# Patient Record
Sex: FEMALE | Race: WHITE | Employment: PART TIME | URBAN - METROPOLITAN AREA
[De-identification: names, ages, dates, MRNs, and addresses within clinical notes are randomized per-mention and may not be internally consistent; named-entity substitution may affect disease eponyms.]

---

## 2017-02-22 ENCOUNTER — TRANSFERRED RECORDS (OUTPATIENT)
Dept: HEALTH INFORMATION MANAGEMENT | Facility: CLINIC | Age: 82
End: 2017-02-22

## 2017-02-22 ENCOUNTER — TELEPHONE (OUTPATIENT)
Dept: OPHTHALMOLOGY | Facility: CLINIC | Age: 82
End: 2017-02-22

## 2017-02-22 NOTE — TELEPHONE ENCOUNTER
"Patient with longstanding bleb right eye, developed \"tearing\" on Sunday, seen Monday by Magali Rebolledo ([facility] out of [facility]) with blebitis.  Started on Q 15 min Vigamox.  Saw Dr Ellis today with bleb leak.  He will inject with intravitreal abx today, patient will see me on Monday.  Will probably need bleb revision.  Amy Gee  "

## 2017-02-23 ENCOUNTER — TRANSFERRED RECORDS (OUTPATIENT)
Dept: HEALTH INFORMATION MANAGEMENT | Facility: CLINIC | Age: 82
End: 2017-02-23

## 2017-02-27 ENCOUNTER — OFFICE VISIT (OUTPATIENT)
Dept: OPHTHALMOLOGY | Facility: CLINIC | Age: 82
End: 2017-02-27
Attending: OPHTHALMOLOGY
Payer: COMMERCIAL

## 2017-02-27 DIAGNOSIS — H59.40 POSTPROCEDURAL BLEBITIS OF EYE: Primary | ICD-10-CM

## 2017-02-27 PROCEDURE — 99213 OFFICE O/P EST LOW 20 MIN: CPT | Mod: ZF

## 2017-02-27 RX ORDER — ERYTHROMYCIN 5 MG/G
OINTMENT OPHTHALMIC
COMMUNITY
Start: 2017-02-23 | End: 2017-05-15

## 2017-02-27 RX ORDER — MOXIFLOXACIN 5 MG/ML
1 SOLUTION/ DROPS OPHTHALMIC 4 TIMES DAILY
COMMUNITY
End: 2017-05-17

## 2017-02-27 RX ORDER — OFLOXACIN 3 MG/ML
1 SOLUTION/ DROPS OPHTHALMIC 4 TIMES DAILY
Qty: 1 BOTTLE | Refills: 3 | Status: SHIPPED | OUTPATIENT
Start: 2017-02-27 | End: 2017-05-17

## 2017-02-27 ASSESSMENT — TONOMETRY
IOP_METHOD: TONOPEN
OD_IOP_MMHG: 01
OS_IOP_MMHG: 19

## 2017-02-27 ASSESSMENT — REFRACTION_WEARINGRX
OS_CYLINDER: +1.50
OS_AXIS: 170
OD_SPHERE: +0.50
OD_ADD: +2.75
OS_ADD: +2.75
OS_SPHERE: -1.50
OD_CYLINDER: +0.75
OD_AXIS: 040

## 2017-02-27 ASSESSMENT — VISUAL ACUITY
OS_PH_CC: 20/50
OD_CC: 20/25
METHOD: SNELLEN - LINEAR
OS_CC: 20/60
OD_CC+: -1
CORRECTION_TYPE: GLASSES

## 2017-02-27 ASSESSMENT — CUP TO DISC RATIO
OS_RATIO: 0.7
OD_RATIO: 0.4

## 2017-02-27 ASSESSMENT — SLIT LAMP EXAM - LIDS
COMMENTS: NORMAL
COMMENTS: NORMAL

## 2017-02-27 ASSESSMENT — CONF VISUAL FIELD
METHOD: COUNTING FINGERS
OS_NORMAL: 1
OD_NORMAL: 1

## 2017-02-27 NOTE — PROGRESS NOTES
"Patient with longstanding bleb right eye, developed \"tearing\" on Sunday Feb 19, seen Monday by Magali Rebolledo ([facility] out of town) with blebitis.  Started on Q 15 min Vigamox.  Saw Dr Ellis the following day with bleb leak and injected with intravitreal antibiotics Tuesday Feb 21.      Patient notes visual acuity right eye poor but stable    Medications   Prednisolone left eye once a day   Vigamox every hour while awake right eye     Follows primarily with Dr. Martins at [facility].  Primary open angle glaucoma (POAG) -adv    1. 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 Glaucoma both eyes.    Trabeculectomy with mitomycin-C right eye 2003,    Ahmed left eye 2007   Intraocular pressure low right eye in past, left eye was in low teens 2014.       3. Follows up with Dr Martins    4. Pseudophakia both eyes     See Dr Martins 1 week, return here immediately for any worsening symptoms in the interim    Attending Physician Attestation:  Complete documentation of historical and exam elements from today's encounter can be found in the full encounter summary report (not reduplicated in this progress note). I personally obtained the chief complaint(s) and history of present illness. I confirmed and edited asnecessary the review of systems, past medical/surgical history, family history, social history, and examination findings as documented by others; and I examined the patient myself. I personally reviewed the relevant tests, images, and reports as documented above. I formulated and edited as necessary the assessment and plan and discussed the findings and management plan with the patient and family.  - Amy Gee MD 2:40 PM 2/27/2017    "

## 2017-02-27 NOTE — NURSING NOTE
Chief Complaints and History of Present Illnesses   Patient presents with     Consult For     red, watery     HPI    Affected eye(s):  Right   Symptoms:        Frequency:  Constant       Do you have eye pain now?:  No      Comments:  Red, watery RE that started 02/21  Feels that she needs to keep her head back to keep it from watering   States that the va has decreased  Jovanna Carmona COT 2:15 PM February 27, 2017

## 2017-02-27 NOTE — LETTER
"February 27,2017      Wilder joyner MD  [facility]  [address] , [address]  [place] [address]  Fax: [phone]      RE: JOSEFA NEGRETE  DO: 1935      Dear Dr. Sam joyner:    I had the pleasure of seeing Josefa negrete on February 27, 2017 at the [facility].  My exam findings are as follows:      Visual Acuity (Snellen - Linear)      Right Left   Dist cc 20/25 -1 20/60   Dist ph cc  20/50       Correction:  Glasses         Tonometry (Ton open, 2:31 PM)      Right Left   Pressure 01 19            Main Ophthalmology Exam     Slit Lamp Exam      Right Left    Lids/Lashes Normal Normal    Conjunctiva/Sclera thin, avascular bleb slightly on to cornea with small central leak, no cell in bleb Tube covered and in good position    Cornea Clear [place]     Anterior Chamber Deep and quiet, no cell Deep and quiet    Iris Round and reactive Pseudoexfoliation material    Lens Posterior chamber intraocular lens Posterior chamber intraocular lens      Fundus Exam      Right Left    Disc  thinning temporally    C/D Ratio 0.4 0.7             History and Present Illness:  Patient with longstanding bleb right eye, developed \"tearing\" on Sunday Feb 19, seen Monday by Magali Rebolledo ([facility] out of town) with bulbitis.  Started on Q 15 min Viam.  Saw Dr. Ellis the following day with bleb leak and injected with intravitreal antibiotics Tuesday Feb 21.      Patient notes visual acuity right eye poor but stable.    Medications:   Prednisolone left eye once a day   Viam every hour while awake right eye     Follows primarily with Dr. Sam joyner at [facility].  Primary open angle glaucoma (PO AG)   - Advanced    1. Bulbitis/possible endophthalmitis right eye   Bleb leak (small)   Bleb now clear, no cell in anterior chamber    Low intraocular pressure    Cost of Viam difficult for patient so will change to Ocuflox four times a day right eye for the next               week   Very thin " bleb, will need bleb revision right eye     2. Glaucoma both eyes.    Trabeculectomy with mitomycin-C right eye 2003,    Ah med left eye 2007   Intraocular pressure low right eye in past, left eye was in low teens 2014.       3. Follows up with Dr. Sam joyner    4. Pseudophakia both eyes     See Dr. Sam joyner 1 week, return here immediately for any worsening symptoms in the interim.      Thank you for allowing me to participate in her care.       Sincerely,       Amy Gee MD  Glaucoma   Havefrederic Professor of Ophthalmology       Cc: Alberto Ellis MD

## 2017-02-27 NOTE — MR AVS SNAPSHOT
After Visit Summary   2017    Tamika Mcguire    MRN: [id number]           Patient Information     Date Of Birth          1935        Visit Information        Provider Department      2017 2:00 PM Amy Gee MD [facility]        Today's Diagnoses     Postprocedural blebitis of eye    -  1       Follow-ups after your visit        Who to contact     Please call your clinic at [phone] to:    Ask questions about your health    Make or cancel appointments    Discuss your medicines    Learn about your test results    Speak to your doctor   If you have compliments or concerns about an experience at your clinic, or if you wish to file a complaint, please contact [facility] Patient Relations at [phone] or email us at [email or web address]         Additional Information About Your Visit        MyChart Information     [employer] is an electronic gateway that provides easy, online access to your medical records. With [employer], you can request a clinic appointment, read your test results, renew a prescription or communicate with your care team.     To sign up for [employer] visit the website at [email or web address]   You will be asked to enter the access code listed below, as well as some personal information. Please follow the directions to create your username and password.     Your access code is: [email or web address]  Expires: 2017  8:30 AM     Your access code will  in 90 days. If you need help or a new code, please contact your [facility] or call [phone] for assistance.        Care EveryWhere ID     This is your Care EveryWhere ID. This could be used by other organizations to access your [place] medical records  [id number]         Blood Pressure from Last 3 Encounters:   12 136/80    Weight from Last 3 Encounters:   12 64.5 kg (142 lb 3.2 oz)              Today, you had the following      No orders found for display         Today's Medication Changes          These changes are accurate as of: 2/27/17  3:07 PM.  If you have any questions, ask your nurse or doctor.               Start taking these medicines.        Dose/Directions    ofloxacin 0.3 % ophthalmic solution   Commonly known as:  OCUFLOX   Used for:  Postprocedural blebitis of eye   Started by:  Amy Gee MD        Dose:  1 drop   Place 1 drop into the right eye 4 times daily   Quantity:  1 Bottle   Refills:  3            Where to get your medicines      These medications were sent to [facility] [address] [facility] [address] [address] [facility] [place]  [address] [facility] [address]     Phone:  [phone]     ofloxacin 0.3 % ophthalmic solution                Primary Care Provider Office Phone # Fax #    Jerod Milligan [phone]       [address] [address]        Thank you!     Thank you for choosing EYE CLINIC  for your care. Our goal is always to provide you with excellent care. Hearing back from our patients is one way we can continue to improve our services. Please take a few minutes to complete the written survey that you may receive in the mail after your visit with us. Thank you!             Your Updated Medication List - Protect others around you: Learn how to safely use, store and throw away your medicines at www.disposemymeds.org.          This list is accurate as of: 2/27/17  3:07 PM.  Always use your most recent med list.                   Brand Name Dispense Instructions for use    atenolol 50 MG tablet    TENORMIN     Take 25 mg by mouth daily       erythromycin ophthalmic ointment    ROMYCIN     Apply 1/4 inch strip to right eye daily at bedtime.       hydrochlorothiazide 25 MG tablet    HYDRODIURIL    90 tablet    Take 1 tablet by mouth daily.       moxifloxacin 0.5 % ophthalmic solution    VIGAMOX     Place 1 drop into the right eye every hour (while awake)        ALIX 128 2 % ophthalmic solution   Generic drug:  sodium chloride      Place 1 drop into both eyes 2 times daily       ofloxacin 0.3 % ophthalmic solution    OCUFLOX    1 Bottle    Place 1 drop into the right eye 4 times daily       prednisoLONE acetate 1 % ophthalmic susp    PRED FORTE     Place 1 drop Into the left eye 2 times daily

## 2017-03-20 ENCOUNTER — OFFICE VISIT (OUTPATIENT)
Dept: OPHTHALMOLOGY | Facility: CLINIC | Age: 82
End: 2017-03-20
Attending: OPHTHALMOLOGY
Payer: COMMERCIAL

## 2017-03-20 DIAGNOSIS — H59.89 LEAKING OF CONJUNCTIVAL DRAINAGE BLEB: Primary | ICD-10-CM

## 2017-03-20 DIAGNOSIS — T81.31XA LEAKING OF CONJUNCTIVAL DRAINAGE BLEB: Primary | ICD-10-CM

## 2017-03-20 PROCEDURE — 92015 DETERMINE REFRACTIVE STATE: CPT | Mod: ZF

## 2017-03-20 PROCEDURE — 99213 OFFICE O/P EST LOW 20 MIN: CPT | Mod: ZF

## 2017-03-20 ASSESSMENT — REFRACTION_MANIFEST
OS_ADD: +2.50
OS_CYLINDER: +2.00
OS_AXIS: 170
OS_SPHERE: -2.00

## 2017-03-20 ASSESSMENT — TONOMETRY
IOP_METHOD: APPLANATION
OD_IOP_MMHG: 09
OS_IOP_MMHG: 19

## 2017-03-20 ASSESSMENT — CUP TO DISC RATIO
OS_RATIO: 0.7
OD_RATIO: 0.4

## 2017-03-20 ASSESSMENT — SLIT LAMP EXAM - LIDS
COMMENTS: NORMAL
COMMENTS: NORMAL

## 2017-03-20 ASSESSMENT — CONF VISUAL FIELD
OS_NORMAL: 1
OD_NORMAL: 1

## 2017-03-20 ASSESSMENT — VISUAL ACUITY
METHOD: SNELLEN - LINEAR
OD_SC: 20/20
OS_PH_SC: 20/50-1
OS_SC: 20/80
OS_SC+: -1

## 2017-03-20 NOTE — PROGRESS NOTES
Patient with longstanding bleb right eye.  Blebitis right eye Feb 2017     Patient notes visual acuity right eye poor but stable    Medications   Prednisolone left eye once a day   Ocuflox four times a day right eye   Refresh both eyes     Follows primarily with Dr. Martins at [facility].  Primary open angle glaucoma (POAG) -adv    1. Blebitishistory right eye   Bleb leak, now in new location, small   Bleb clear, no cell in anterior chamber    Better intraocular pressure    Ocuflox four times a day right eye until today, now go to twice a day until surgery   Very thin bleb with new leak, will need bleb revision right eye     2. Glaucoma both eyes.    Trabeculectomy with mitomycin-C right eye 2003,    Ahmed left eye 2007   Intraocular pressure low right eye in past, left eye was in low teens 2014.      3. 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 I personally obtained the chief complaint(s) and history of present illness. I confirmed and edited asnecessary the review of systems, past medical/surgical history, family history, social history, and examination findings as documented by others; and I examined the patient myself. I personally reviewed the relevant tests, images, and reports as documented above. I formulated and edited as necessary the assessment and plan and discussed the findings and management plan with the patient and family.  - Amy Gee MD 2:37 PM 3/20/2017

## 2017-03-20 NOTE — NURSING NOTE
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    Mike BAIRES  1:30 PM03/20/2017

## 2017-03-20 NOTE — MR AVS SNAPSHOT
After Visit Summary   3/20/2017    Tamika Mcguire    MRN: [id number]           Patient Information     Date Of Birth          1935        Visit Information        Provider Department      3/20/2017 1:00 PM Amy Gee MD [facility]        Today's Diagnoses     Leaking of conjunctival drainage bleb - Right Eye    -  1       Follow-ups after your visit        Your next 10 appointments already scheduled     Apr 12, 2017 12:30 PM CDT   Post-Op with Amy Gee MD   Eye Clinic ([facility])    [address]  [address]  [address]  [facility] [address]   [phone]            Apr 19, 2017  2:00 PM CDT   Post-Op with Amy Gee MD   Eye Clinic ([facility])    [address]  [address]  [address]  [facility] [address]   [phone]            May 03, 2017  2:15 PM CDT   Post-Op with Amy Gee MD   Eye Clinic ([facility])    [address]  [address]  [address]  [facility] [address]   [phone]            May 17, 2017  1:15 PM CDT   Post-Op with Amy Gee MD   Eye Clinic ([facility])    [address]  [address]  [address] [address]  [facility] [address] [phone]              Who to contact     Please call your clinic at [phone] to:    Ask questions about your health    Make or cancel appointments    Discuss your medicines    Learn about your test results    Speak to your doctor   If you have compliments or concerns about an experience at your clinic, or if you wish to file a complaint, please contact [facility] Patient Relations at [phone] or email us at [email or web address]         Additional Information About Your Visit        Accessory Addict Societyhart Information     "[employer]" is an electronic gateway that provides easy, online access to your medical records. With "[employer]", you can request a clinic appointment,  read your test results, renew a prescription or communicate with your care team.     To sign up for [employer] Win[employer] Communicationshart visit the website at [email or web address]   You will be asked to enter the access code listed below, as well as some personal information. Please follow the directions to create your username and password.     Your access code is: [email or web address]  Expires: 2017  9:30 AM     Your access code will  in 90 days. If you need help or a new code, please contact your [facility] or call [phone] for assistance.        Care EveryWhere ID     This is your Care EveryWhere ID. This could be used by other organizations to access your [place] medical records  [id number]         Blood Pressure from Last 3 Encounters:   12 136/80    Weight from Last 3 Encounters:   12 64.5 kg (142 lb 3.2 oz)              We Performed the Following     Monica-Operative Worksheet (Glaucoma)        Primary Care Provider Office Phone # Fax #    Jerod Milligan [phone]       [place] [address]        Thank you!     Thank you for choosing [facility]YE CLINIC  for your care. Our goal is always to provide you with excellent care. Hearing back from our patients is one way we can continue to improve our services. Please take a few minutes to complete the written survey that you may receive in the mail after your visit with us. Thank you!             Your Updated Medication List - Protect others around you: Learn how to safely use, store and throw away your medicines at www.disposemymeds.org.          This list is accurate as of: 3/20/17 11:59 PM.  Always use your most recent med list.                   Brand Name Dispense Instructions for use    atenolol 50 MG tablet    TENORMIN     Take 25 mg by mouth daily       erythromycin ophthalmic ointment    ROMYCIN     Apply 1/4 inch strip to right eye daily at bedtime.        hydrochlorothiazide 25 MG tablet    HYDRODIURIL    90 tablet    Take 1 tablet by mouth daily.       moxifloxacin 0.5 % ophthalmic solution    VIGAMOX     Place 1 drop into the right eye 4 times daily       ofloxacin 0.3 % ophthalmic solution    OCUFLOX    1 Bottle    Place 1 drop into the right eye 4 times daily       prednisoLONE acetate 1 % ophthalmic susp    PRED FORTE     Place 1 drop Into the left eye daily       REFRESH OP      Apply to eye 4 times daily Both eyes.

## 2017-03-20 NOTE — LETTER
2017      Wilder Martins MD  [facility]  [address] , [address]  [place] [address]  Fax: [phone]      RE: JOSEFA MCGUIRE  : 1935      Dear Dr. Martins:    I had the pleasure of seeing Josefa Mcguire on 2017 at the [facility].  My exam findings are as follows:      Visual Acuity (Snellen - Linear)      Right Left   Dist sc 20/20 20/80 -1   Dist ph sc  20/50-1            Tonometry (Applanation, 1:43 PM)      Right Left   Pressure 09 19            Main Ophthalmology Exam     Slit Lamp Exam      Right Left    Lids/Lashes Normal Normal    Conjunctiva/Sclera thin, avascular bleb slightly on to cornea with superior leak, no cell in bleb, small leak on posterior bleb Tube covered and in good position    Cornea Clear dsaek     Anterior Chamber Deep and quiet, no cell Deep and quiet    Iris Round and reactive Pseudoexfoliation material    Lens Posterior chamber intraocular lens Posterior chamber intraocular lens      Fundus Exam      Right Left    Disc  thinning temporally    C/D Ratio 0.4 0.7              History and Present Illness:  Primary open angle glaucoma (POAG) -adv  Follows primarily with Dr. Martins at [facility].    Patient with longstanding bleb right eye.  Blebitis right eye 2017.   Patient notes visual acuity right eye poor but stable.    Medications:   Prednisolone left eye once a day   Ocuflox four times a day right eye   Refresh both eyes       Assessment:   1. Blebitis history right eye   Bleb leak, now in new location, small   Bleb clear, no cell in anterior chamber    Better intraocular pressure    Ocuflox four times a day right eye until today, now go to twice a day until surgery   Very thin bleb with new leak, will need bleb revision right eye     2. Glaucoma both eyes.    Trabeculectomy with mitomycin-C right eye ,    Ahmed left eye    Intraocular pressure low right eye in past, left eye was in low teens  2014.      3. 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       Sincerely,     Amy Gee MD  Glaucoma   Havefrederic Professor of Ophthalmology

## 2017-04-11 ENCOUNTER — TRANSFERRED RECORDS (OUTPATIENT)
Dept: HEALTH INFORMATION MANAGEMENT | Facility: CLINIC | Age: 82
End: 2017-04-11

## 2017-04-12 ENCOUNTER — OFFICE VISIT (OUTPATIENT)
Dept: OPHTHALMOLOGY | Facility: CLINIC | Age: 82
End: 2017-04-12
Attending: OPHTHALMOLOGY
Payer: COMMERCIAL

## 2017-04-12 DIAGNOSIS — Z48.810 AFTERCARE FOLLOWING SURGERY OF A SENSORY ORGAN: Primary | ICD-10-CM

## 2017-04-12 PROCEDURE — 99212 OFFICE O/P EST SF 10 MIN: CPT

## 2017-04-12 ASSESSMENT — VISUAL ACUITY
OD_PH_CC: 20/70+2
OD_CC: 20/70
OS_CC+: -1
METHOD: SNELLEN - LINEAR
OS_CC: 20/40
CORRECTION_TYPE: GLASSES

## 2017-04-12 ASSESSMENT — TONOMETRY
IOP_METHOD: APPLANATION
OD_IOP_MMHG: 04
OS_IOP_MMHG: 16

## 2017-04-12 ASSESSMENT — SLIT LAMP EXAM - LIDS
COMMENTS: NORMAL
COMMENTS: NORMAL

## 2017-04-12 ASSESSMENT — CUP TO DISC RATIO
OD_RATIO: 0.4
OS_RATIO: 0.7

## 2017-04-12 NOTE — PROGRESS NOTES
Postoperative day # 1 bleb revision with amniotic membrane graft right eye 4/1//17  Patient with longstanding bleb right eye.  Blebitis right eye Feb 2017   Follows primarily with Dr. Martins at [facility]    Patient notes visual acuity right eye blurred    Medications   Prednisolone    Taper right eye   left eye once a day    Refresh both eyes     Impression    1. Blebitis history right eye   No leak today    Low intraocular pressure causing blur    2. Glaucoma both eyes. Primary open angle glaucoma (POAG) -adv   Trabeculectomy with mitomycin-C right eye 2003,    Ahmed left eye 2007   Intraocular pressure low right eye in past, left eye was in low teens 2014.      3. 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  Return to clinic 1 week    Attending Physician Attestation:  Complete documentation of historical and exam elements from today's encounter can be found in the full encounter summary report (not reduplicated in this progress note). I personally obtained the chief complaint(s) and history of present illness. I confirmed and edited asnecessary the review of systems, past medical/surgical history, family history, social history, and examination findings as documented by others; and I examined the patient myself. I personally reviewed the relevant tests, images, and reports as documented above. I formulated and edited as necessary the assessment and plan and discussed the findings and management plan with the patient and family.  - Amy Gee MD 1:04 PM 4/12/2017

## 2017-04-12 NOTE — NURSING NOTE
Chief Complaints and History of Present Illnesses   Patient presents with     Post Op (Ophthalmology) Right Eye     1 day post op RE     HPI    Affected eye(s):  Right   Symptoms:        Duration:  1 day      Do you have eye pain now?:  No      Comments:  1 day post op RE  Had good night, slept well  Prednisolone qd JIMMY BAIRES 12:12 PM April 12, 2017

## 2017-04-12 NOTE — MR AVS SNAPSHOT
After Visit Summary   4/12/2017    Tamika Mcguire    MRN: [id number]           Patient Information     Date Of Birth          1935        Visit Information        Provider Department      4/12/2017 12:30 PM Amy Gee MD Eye Clinic        Today's Diagnoses     Aftercare following surgery of a sensory organ    -  1       Follow-ups after your visit        Your next 10 appointments already scheduled     Apr 19, 2017  2:00 PM CDT   Post-Op with Amy Gee MD   Eye Clinic ([facility])    [address]  [address]  [address] [address]  [facility] [address]   [phone]            May 03, 2017  2:15 PM CDT   Post-Op with Amy Gee MD   Eye Clinic ([facility])    [address]  [address]  [address] [address]  [facility] [address]   [phone]            May 17, 2017  1:15 PM CDT   Post-Op with Amy Gee MD   Eye Clinic ([facility])    [address]  [address]  [address] [address]  [facility] [address]   [phone]              Who to contact     Please call your clinic at [phone] to:    Ask questions about your health    Make or cancel appointments    Discuss your medicines    Learn about your test results    Speak to your doctor   If you have compliments or concerns about an experience at your clinic, or if you wish to file a complaint, please contact [facility] Patient Relations at [phone] or email us at [email or web address]         Additional Information About Your Visit        MyChart Information     [employer] is an electronic gateway that provides easy, online access to your medical records. With [employer], you can request a clinic appointment, read your test results, renew a prescription or communicate with your care team.     To sign up for [employer] visit the website at [email or web address]   You will be asked to enter the access code listed  below, as well as some personal information. Please follow the directions to create your username and password.     Your access code is: [email or web address]  Expires: 2017  9:30 AM     Your access code will  in 90 days. If you need help or a new code, please contact your [facility] or call [phone] for assistance.        Care EveryWhere ID     This is your Care EveryWhere ID. This could be used by other organizations to access your [place] medical records  [id number]         Blood Pressure from Last 3 Encounters:   12 136/80    Weight from Last 3 Encounters:   12 64.5 kg (142 lb 3.2 oz)              Today, you had the following     No orders found for display       Primary Care Provider Office Phone # Fax #    Jerod Milligan [phone]       [place] [address]        Thank you!     Thank you for choosing EYE CLINIC  for your care. Our goal is always to provide you with excellent care. Hearing back from our patients is one way we can continue to improve our services. Please take a few minutes to complete the written survey that you may receive in the mail after your visit with us. Thank you!             Your Updated Medication List - Protect others around you: Learn how to safely use, store and throw away your medicines at www.disposemymeds.org.          This list is accurate as of: 17  1:19 PM.  Always use your most recent med list.                   Brand Name Dispense Instructions for use    atenolol 50 MG tablet    TENORMIN     Take 25 mg by mouth daily       erythromycin ophthalmic ointment    ROMYCIN     Apply 1/4 inch strip to right eye daily at bedtime.       hydrochlorothiazide 25 MG tablet    HYDRODIURIL    90 tablet    Take 1 tablet by mouth daily.       moxifloxacin 0.5 % ophthalmic solution    VIGAMOX     Place 1 drop into the right eye 4 times daily       ofloxacin 0.3 % ophthalmic  solution    OCUFLOX    1 Bottle    Place 1 drop into the right eye 4 times daily       prednisoLONE acetate 1 % ophthalmic susp    PRED FORTE     Place 1 drop Into the left eye daily       REFRESH OP      Apply to eye 4 times daily Both eyes.

## 2017-04-19 ENCOUNTER — OFFICE VISIT (OUTPATIENT)
Dept: OPHTHALMOLOGY | Facility: CLINIC | Age: 82
End: 2017-04-19
Attending: OPHTHALMOLOGY
Payer: COMMERCIAL

## 2017-04-19 DIAGNOSIS — Z48.810 AFTERCARE FOLLOWING SURGERY OF A SENSORY ORGAN: Primary | ICD-10-CM

## 2017-04-19 PROCEDURE — 99213 OFFICE O/P EST LOW 20 MIN: CPT | Mod: ZF

## 2017-04-19 ASSESSMENT — PACHYMETRY
OS_CT(UM): 575
OD_CT(UM): 556

## 2017-04-19 ASSESSMENT — CUP TO DISC RATIO
OD_RATIO: 0.4
OS_RATIO: 0.7

## 2017-04-19 ASSESSMENT — TONOMETRY
OS_IOP_MMHG: 21
IOP_METHOD: APPLANATION
OD_IOP_MMHG: 02

## 2017-04-19 ASSESSMENT — VISUAL ACUITY
OS_CC: 20/50
OD_PH_CC: 20/50-
OD_CC: 20/70
OS_CC+: -2
CORRECTION_TYPE: GLASSES
METHOD: SNELLEN - LINEAR
OD_CC+: -1
OS_PH_CC: 20/50+

## 2017-04-19 ASSESSMENT — SLIT LAMP EXAM - LIDS
COMMENTS: NORMAL
COMMENTS: NORMAL

## 2017-04-19 NOTE — PROGRESS NOTES
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  Blebitis right eye Feb 2017   Follows primarily with Dr. Martins at [facility]    Interval History: Notes bleb leaks when getting up in the morning. Has occasional discomfort. Use some artifical tears which helps at times.     Medications   Prednisolone    Taper right eye    Left eye once a day    Refresh both eyes     Impression    1. Blebitis history right eye   No leak today    Low intraocular pressure causing blur    2. Glaucoma both eyes. Primary open angle glaucoma (POAG) -adv   Trabeculectomy with mitomycin-C right eye 2003,    Ahmed left eye 2007   Intraocular pressure low right eye in past, left eye was in low teens 2014.      3. 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  Continue shield at bedtime  Return to clinic 2 weeks    Mika Sebastian MD PGY-3  Resident     Attending Physician Attestation:  Complete documentation of historical and exam elements from today's encounter can be found in the full encounter summary report (not reduplicated in this progress note). I personally obtained the chief complaint(s) and history of present illness. I confirmed and edited asnecessary the review of systems, past medical/surgical history, family history, social history, and examination findings as documented by others; and I examined the patient myself. I personally reviewed the relevant tests, images, and reports as documented above. I formulated and edited as necessary the assessment and plan and discussed the findings and management plan with the patient and family.  - Amy Gee MD 3:45 PM 4/19/2017

## 2017-04-19 NOTE — NURSING NOTE
Chief Complaints and History of Present Illnesses   Patient presents with     Follow Up For     Bleb revision with amniotic membrane graft right eye 4/1//17      HPI    Affected eye(s):  Right   Symptoms:     Blurred vision   Decreased vision   Floaters (Comment: present only for one day in the RE and then went away)         Do you have eye pain now?:  No      Comments:  Pt states RE is not doing well. Complains that it leaks intermittently. Notes this especially when she leans forward in the morning to get up.  Brittani Bajwa COA 2:12 PM April 19, 2017

## 2017-04-19 NOTE — MR AVS SNAPSHOT
After Visit Summary   4/19/2017    Tamika Mcguire    MRN: [id number]           Patient Information     Date Of Birth          1935        Visit Information        Provider Department      4/19/2017 2:00 PM Amy Gee MD Eye Clinic        Today's Diagnoses     Aftercare following surgery of a sensory organ    -  1       Follow-ups after your visit        Your next 10 appointments already scheduled     May 03, 2017  2:15 PM CDT   Post-Op with Amy Gee MD   Eye Clinic ([facility])    [address]  [address] [place] [address]   [phone]            May 17, 2017  1:15 PM CDT   Post-Op with Amy Gee MD   Eye Clinic ([facility])    [address]  [address] [address]   [phone]              Who to contact     Please call your clinic at [phone] to:    Ask questions about your health    Make or cancel appointments    Discuss your medicines    Learn about your test results    Speak to your doctor   If you have compliments or concerns about an experience at your clinic, or if you wish to file a complaint, please contact [facility] Patient Relations at [phone] or email us at [email or web address]         Additional Information About Your Visit        MyChart Information     [employer] is an electronic gateway that provides easy, online access to your medical records. With [employer], you can request a clinic appointment, read your test results, renew a prescription or communicate with your care team.     To sign up for [employer] visit the website at [email or web address]   You will be asked to enter the access code listed below, as well as some personal information. Please follow the directions to create your username and password.     Your access code is: [email or web address]  Expires: 5/24/2017  9:30 AM     Your access code will   in 90 days. If you need help or a new code, please contact your [facility] or call [phone] for assistance.        Care EveryWhere ID     This is your Care EveryWhere ID. This could be used by other organizations to access your [place] medical records  [id number]         Blood Pressure from Last 3 Encounters:   12 136/80    Weight from Last 3 Encounters:   12 64.5 kg (142 lb 3.2 oz)              Today, you had the following     No orders found for display       Primary Care Provider Office Phone # Fax #    Jerod Milligan [phone]       [address] [address]        Thank you!     Thank you for choosing EYE CLINIC  for your care. Our goal is always to provide you with excellent care. Hearing back from our patients is one way we can continue to improve our services. Please take a few minutes to complete the written survey that you may receive in the mail after your visit with us. Thank you!             Your Updated Medication List - Protect others around you: Learn how to safely use, store and throw away your medicines at www.disposemymeds.org.          This list is accurate as of: 17  3:52 PM.  Always use your most recent med list.                   Brand Name Dispense Instructions for use    atenolol 50 MG tablet    TENORMIN     Take 25 mg by mouth daily       erythromycin ophthalmic ointment    ROMYCIN     Apply 1/4 inch strip to right eye daily at bedtime.       hydrochlorothiazide 25 MG tablet    HYDRODIURIL    90 tablet    Take 1 tablet by mouth daily.       moxifloxacin 0.5 % ophthalmic solution    VIGAMOX     Place 1 drop into the right eye 4 times daily       ofloxacin 0.3 % ophthalmic solution    OCUFLOX    1 Bottle    Place 1 drop into the right eye 4 times daily       prednisoLONE acetate 1 % ophthalmic susp    PRED FORTE     Place 1 drop Into the left eye daily       REFRESH OP       Apply to eye 4 times daily Both eyes.

## 2017-05-03 ENCOUNTER — OFFICE VISIT (OUTPATIENT)
Dept: OPHTHALMOLOGY | Facility: CLINIC | Age: 82
End: 2017-05-03
Attending: OPHTHALMOLOGY
Payer: COMMERCIAL

## 2017-05-03 DIAGNOSIS — H59.40: Primary | ICD-10-CM

## 2017-05-03 DIAGNOSIS — Z48.810 AFTERCARE FOLLOWING SURGERY OF A SENSORY ORGAN: ICD-10-CM

## 2017-05-03 DIAGNOSIS — H40.1133 PRIMARY OPEN ANGLE GLAUCOMA OF BOTH EYES, SEVERE STAGE: ICD-10-CM

## 2017-05-03 PROCEDURE — 99213 OFFICE O/P EST LOW 20 MIN: CPT | Mod: ZF

## 2017-05-03 RX ORDER — FLUOROMETHOLONE 0.1 %
1 SUSPENSION, DROPS(FINAL DOSAGE FORM)(ML) OPHTHALMIC (EYE) 4 TIMES DAILY
Qty: 1 BOTTLE | Refills: 3 | Status: SHIPPED | OUTPATIENT
Start: 2017-05-03 | End: 2017-05-04

## 2017-05-03 RX ORDER — DORZOLAMIDE HYDROCHLORIDE AND TIMOLOL MALEATE 20; 5 MG/ML; MG/ML
1 SOLUTION/ DROPS OPHTHALMIC 2 TIMES DAILY
Qty: 1 BOTTLE | Refills: 11 | Status: SHIPPED | OUTPATIENT
Start: 2017-05-03 | End: 2017-07-06

## 2017-05-03 ASSESSMENT — TONOMETRY
OS_IOP_MMHG: 25
IOP_METHOD: APPLANATION
OD_IOP_MMHG: 38

## 2017-05-03 ASSESSMENT — VISUAL ACUITY
OD_CC: 20/40
OD_PH_CC: 20/30-1
OS_CC: 20/60
METHOD: SNELLEN - LINEAR
CORRECTION_TYPE: GLASSES
OD_CC+: +/-

## 2017-05-03 ASSESSMENT — PACHYMETRY
OD_CT(UM): 556
OS_CT(UM): 575

## 2017-05-03 ASSESSMENT — SLIT LAMP EXAM - LIDS: COMMENTS: NORMAL

## 2017-05-03 ASSESSMENT — CUP TO DISC RATIO
OS_RATIO: 0.7
OD_RATIO: 0.4

## 2017-05-03 NOTE — PROGRESS NOTES
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  Blebitis right eye Feb 2017   Follows primarily with Dr. Martins at [facility]    Interval History: Eye was sore all day yesterday. Today it is uncomfortable but not sore. Thinks it is swollen. Vision has improved but is not perfect.     Medications   Prednisolone    Taper right eye   Left eye once a day    Refresh both eyes     Impression    1. Blebitis history right eye   No leak today     2. Glaucoma both eyes. Primary open angle glaucoma (POAG) -adv   Trabeculectomy with mitomycin-C right eye 2003,    Ahmed left eye 2007   Bleb revision 4/1/2017    3. 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      Plan  Bleb revision with amniotic membrane graft right eye 4/1//17   Bleb now encapsulated, treat medically, may be steroid responder   Stop prednisolone both eyes    Begin FML twice a day right eye and once a day left eye (start 5/3/17)   Dorzolamide timolol right eye twice a day (start 5/3/17)       Continue shield at bedtime  Return to clinic 1 weeks    Mika Sebastian MD PGY-3  Resident     Attending Physician Attestation:  Complete documentation of historical and exam elements from today's encounter can be found in the full encounter summary report (not reduplicated in this progress note). I personally obtained the chief complaint(s) and history of present illness. I confirmed and edited asnecessary the review of systems, past medical/surgical history, family history, social history, and examination findings as documented by others; and I examined the patient myself. I personally reviewed the relevant tests, images, and reports as documented above. I formulated and edited as necessary the assessment and plan and discussed the findings and management plan with the patient and family.  - Amy Gee MD 3:45 PM  4/19/2017

## 2017-05-03 NOTE — PATIENT INSTRUCTIONS
Stop prednisolone both eyes (Pink cap)   Begin FML twice a day right eye and once a day left eye (Pink or white)   Dorzolamide timolol right eye twice a day (Navy cap)   Stop Vigamox/Ocuflox (Tan Top)   Continue artificial tears as needed

## 2017-05-03 NOTE — MR AVS SNAPSHOT
After Visit Summary   5/3/2017    Tamika Mcguire    MRN: [id number]           Patient Information     Date Of Birth          1935        Visit Information        Provider Department      5/3/2017 2:15 PM Amy Gee MD Eye Clinic        Today's Diagnoses     Inflammation of postprocedural bleb of eye    -  1    Primary open angle glaucoma of both eyes, severe stage        Aftercare following surgery of a sensory organ          Care Instructions     Stop prednisolone both eyes (Pink cap)   Begin FML twice a day right eye and once a day left eye (Pink or white)   Dorzolamide timolol right eye twice a day (Navy cap)   Stop Vigamox/Ocuflox (Tan Top)   Continue artificial tears as needed        Follow-ups after your visit        Your next 10 appointments already scheduled     May 12, 2017  9:00 AM CDT   Post-Op with Amy Gee MD   Eye Clinic ([facility])    [address]  [address]  [facility] [address]   [phone]            May 17, 2017  1:15 PM CDT   Post-Op with Amy Gee MD   Eye Clinic ([facility])    [address]  [address]  [address]  [facility] [address]   [phone]              Who to contact     Please call your clinic at [phone] to:    Ask questions about your health    Make or cancel appointments    Discuss your medicines    Learn about your test results    Speak to your doctor   If you have compliments or concerns about an experience at your clinic, or if you wish to file a complaint, please contact [facility] Patient Relations at [phone] or email us at [email or web address]         Additional Information About Your Visit        Prime Gridhart Information     [employer] is an electronic gateway that provides easy, online access to your medical records. With [employer], you can request a clinic appointment, read your test results, renew a  prescription or communicate with your care team.     To sign up for Bloomspothart visit the website at [email or web address]   You will be asked to enter the access code listed below, as well as some personal information. Please follow the directions to create your username and password.     Your access code is: [email or web address]  Expires: 2017  9:30 AM     Your access code will  in 90 days. If you need help or a new code, please contact your [facility] or call [phone] for assistance.        Care EveryWhere ID     This is your Care EveryWhere ID. This could be used by other organizations to access your [place] medical records  [id number]         Blood Pressure from Last 3 Encounters:   12 136/80    Weight from Last 3 Encounters:   12 64.5 kg (142 lb 3.2 oz)              Today, you had the following     No orders found for display         Today's Medication Changes          These changes are accurate as of: 5/3/17  3:52 PM.  If you have any questions, ask your nurse or doctor.               Start taking these medicines.        Dose/Directions    dorzolamide-timolol 2-0.5 % ophthalmic solution   Commonly known as:  COSOPT   Used for:  Primary open angle glaucoma of both eyes, severe stage   Started by:  Amy Gee MD        Dose:  1 drop   Place 1 drop into the right eye 2 times daily   Quantity:  1 Bottle   Refills:  11       fluorometholone 0.1 % ophthalmic susp   Commonly known as:  FML LIQUIFILM   Used for:  Inflammation of postprocedural bleb of eye   Started by:  Amy Gee MD        Dose:  1 drop   Place 1 drop into the right eye 4 times daily   Quantity:  1 Bottle   Refills:  3            Where to get your medicines      These medications were sent to [facility] [id number] [place] [facility]  [address]  [address] [place] [place] [address]     Phone:  [phone]     dorzolamide-timolol 2-0.5 % ophthalmic solution    fluorometholone 0.1 %  ophthalmic susp                Primary Care Provider Office Phone # Fax #    Jerod Milligan [phone]       [address] [address]        Thank you!     Thank you for choosing EYE CLINIC  for your care. Our goal is always to provide you with excellent care. Hearing back from our patients is one way we can continue to improve our services. Please take a few minutes to complete the written survey that you may receive in the mail after your visit with us. Thank you!             Your Updated Medication List - Protect others around you: Learn how to safely use, store and throw away your medicines at www.disposemymeds.org.          This list is accurate as of: 5/3/17  3:52 PM.  Always use your most recent med list.                   Brand Name Dispense Instructions for use    atenolol 50 MG tablet    TENORMIN     Take 25 mg by mouth daily       dorzolamide-timolol 2-0.5 % ophthalmic solution    COSOPT    1 Bottle    Place 1 drop into the right eye 2 times daily       erythromycin ophthalmic ointment    ROMYCIN     Apply 1/4 inch strip to right eye daily at bedtime.       fluorometholone 0.1 % ophthalmic susp    FML LIQUIFILM    1 Bottle    Place 1 drop into the right eye 4 times daily       hydrochlorothiazide 25 MG tablet    HYDRODIURIL    90 tablet    Take 1 tablet by mouth daily.       moxifloxacin 0.5 % ophthalmic solution    VIGAMOX     Place 1 drop into the right eye 4 times daily       ofloxacin 0.3 % ophthalmic solution    OCUFLOX    1 Bottle    Place 1 drop into the right eye 4 times daily       prednisoLONE acetate 1 % ophthalmic susp    PRED FORTE     Place 1 drop Into the left eye daily       REFRESH OP      Apply to eye 4 times daily Both eyes.

## 2017-05-04 ENCOUNTER — TELEPHONE (OUTPATIENT)
Dept: OPHTHALMOLOGY | Facility: CLINIC | Age: 82
End: 2017-05-04

## 2017-05-04 DIAGNOSIS — H59.40: ICD-10-CM

## 2017-05-04 RX ORDER — FLUOROMETHOLONE 0.1 %
1 SUSPENSION, DROPS(FINAL DOSAGE FORM)(ML) OPHTHALMIC (EYE) 2 TIMES DAILY
Qty: 1 BOTTLE | Refills: 3
Start: 2017-05-04 | End: 2017-07-07

## 2017-05-04 NOTE — TELEPHONE ENCOUNTER
----- Message from Zonai Cruz sent at 5/4/2017  2:56 PM CDT -----  Regarding: Eye Drop Question  Contact: [phone]  Pt is requesting a call back regarding her eye drops that were prescribed to her at yesterday's (5/3/17) appt with Dr. Gee. Pt would like to verify that the eye drops are only for her right eye and also why that might be. Please call her back to discuss. Thanks.    KI    Please DO NOT send this message and/or reply back to sender.  Call Center Representatives DO NOT respond to messages.

## 2017-05-04 NOTE — TELEPHONE ENCOUNTER
Reviewed FML drop in right eye 2/day and left eye once daily  cosopt in right eye 2/day  Artificial tear use both eyes    Prednisolone was discontinued  Pt verbally demonstrated understanding  Ernesto Butcher RN 3:25 PM 05/04/17    Medication reconciliation  Prednisolone discontinued in epic  FML changed to reflect use in right eye 2/day per note and added one drop 1/day left eye per note  Ernesto Butcher RN 3:27 PM 05/04/17

## 2017-05-15 DIAGNOSIS — Z98.890 POSTOPERATIVE EYE STATE: Primary | ICD-10-CM

## 2017-05-15 NOTE — TELEPHONE ENCOUNTER
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 Blebitis right eye Feb 2017   Follows primarily with Dr. Martins at [facility]     Interval History: Eye was sore all day yesterday. Today it is uncomfortable but not sore. Thinks it is swollen. Vision has improved but is not perfect.      Medications   Prednisolone   Taper right eye  Left eye once a day   Refresh both eyes      Impression     1. Blebitis history right eye  No leak today      2. Glaucoma both eyes. Primary open angle glaucoma (POAG) -adv  Trabeculectomy with mitomycin-C right eye 2003,   Ahmed left eye 2007  Bleb revision 4/1/2017     3. 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 On med list as historical. If approved need sara AGUILAR. Instructions.   Med list has apply 1/4 \", fax has 1.2\". Please enter correct.thanks.  "

## 2017-05-16 RX ORDER — ERYTHROMYCIN 5 MG/G
OINTMENT OPHTHALMIC
Qty: 3.5 G | Refills: 0 | Status: SHIPPED | OUTPATIENT
Start: 2017-05-16 | End: 2017-05-17

## 2017-05-17 ENCOUNTER — OFFICE VISIT (OUTPATIENT)
Dept: OPHTHALMOLOGY | Facility: CLINIC | Age: 82
End: 2017-05-17
Attending: OPHTHALMOLOGY
Payer: COMMERCIAL

## 2017-05-17 DIAGNOSIS — Z98.890 POSTOPERATIVE EYE STATE: ICD-10-CM

## 2017-05-17 DIAGNOSIS — H59.40: ICD-10-CM

## 2017-05-17 DIAGNOSIS — Z48.810 AFTERCARE FOLLOWING SURGERY OF A SENSORY ORGAN: Primary | ICD-10-CM

## 2017-05-17 PROCEDURE — 99213 OFFICE O/P EST LOW 20 MIN: CPT | Mod: ZF

## 2017-05-17 RX ORDER — ERYTHROMYCIN 5 MG/G
OINTMENT OPHTHALMIC
Qty: 3.5 G | Refills: 3 | Status: SHIPPED | OUTPATIENT
Start: 2017-05-17

## 2017-05-17 ASSESSMENT — SLIT LAMP EXAM - LIDS: COMMENTS: NORMAL

## 2017-05-17 ASSESSMENT — CUP TO DISC RATIO
OD_RATIO: 0.4
OS_RATIO: 0.7

## 2017-05-17 ASSESSMENT — PACHYMETRY
OS_CT(UM): 575
OD_CT(UM): 556

## 2017-05-17 ASSESSMENT — REFRACTION_WEARINGRX
OD_AXIS: 040
OD_CYLINDER: +0.75
OS_ADD: +2.75
OS_CYLINDER: +1.50
OD_ADD: +2.75
OS_SPHERE: -1.50
OD_SPHERE: +0.50
OS_AXIS: 170

## 2017-05-17 ASSESSMENT — VISUAL ACUITY
OD_CC: 20/40
OS_CC: 20/50
OD_PH_CC: 20/30
CORRECTION_TYPE: GLASSES
OS_PH_CC: 20/40
METHOD: SNELLEN - LINEAR

## 2017-05-17 ASSESSMENT — TONOMETRY
IOP_METHOD: APPLANATION
OS_IOP_MMHG: 16
OD_IOP_MMHG: 17

## 2017-05-17 NOTE — PROGRESS NOTES
Postoperative week #5 bleb revision with amniotic membrane graft right eye 4/11//17  Patient with longstanding bleb right eye.  Blebitis right eye Feb 2017   Follows primarily with Dr. Martins at [facility]    Interval History: Doing much better. Eye is more comfortable. Having trouble seeing with current glasses. It is as if the images between both eyes are different sizes.     Medications   FML 2 times daily right eye, 1 time daily left eye   Dorzolamide-Timolol/Cosopt 2 times daily right eye    Erythromycin ointment at bedtime right eye  Refresh both eyes     Impression    1. Blebitis history right eye   No leak today   Bleb encapsulated but slightly more diffuse than 2 weeks ago and intraocular pressure better since starting cosopt and stopping prednisolone     2. Glaucoma both eyes. Primary open angle glaucoma (POAG) -adv   Trabeculectomy with mitomycin-C right eye 2003,    Ahmed left eye 2007   Bleb revision 4/1/2017    3. 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 Martins  2-3 weeks    Mika Sebastian MD PGY-3  Resident     Attending Physician Attestation:  Complete documentation of historical and exam elements from today's encounter can be found in the full encounter summary report (not reduplicated in this progress note). I personally obtained the chief complaint(s) and history of present illness. I confirmed and edited asnecessary the review of systems, past medical/surgical history, family history, social history, and examination findings as documented by others; and I examined the patient myself. I personally reviewed  the relevant tests, images, and reports as documented above. I formulated and edited as necessary the assessment and plan and discussed the findings and management plan with the patient and family.  - Amy Gee MD 3:45 PM 4/19/2017

## 2017-05-17 NOTE — NURSING NOTE
Chief Complaints and History of Present Illnesses   Patient presents with     Post Op (Ophthalmology) Right Eye     HPI    Symptoms:     No decreased vision         Do you have eye pain now?:  No      Comments:  POP for bleb revision with amniotic membrane graft right eye 4/1//17.  The patient had a one time brief pain yesterday in the right eye.   VIRY Beasley 1:04 PM 05/17/2017

## 2017-05-17 NOTE — MR AVS SNAPSHOT
After Visit Summary   2017    Tamika Mcguire    MRN: [id number]           Patient Information     Date Of Birth          1935        Visit Information        Provider Department      2017 1:15 PM [facility]        Today's Diagnoses     Aftercare following surgery of a sensory organ    -  1    Inflammation of postprocedural bleb of eye        Postoperative eye state           Follow-ups after your visit        Who to contact     Please call your clinic at [phone] to:    Ask questions about your health    Make or cancel appointments    Discuss your medicines    Learn about your test results    Speak to your doctor   If you have compliments or concerns about an experience at your clinic, or if you wish to file a complaint, please contact [facility] Patient Relations at [phone] or email us at [email or web address]         Additional Information About Your Visit        MyChart Information     [employer] is an electronic gateway that provides easy, online access to your medical records. With [employer], you can request a clinic appointment, read your test results, renew a prescription or communicate with your care team.     To sign up for [email or web address] visit the website at [email or web address]   You will be asked to enter the access code listed below, as well as some personal information. Please follow the directions to create your username and password.     Your access code is: [email or web address]  Expires: 2017  9:30 AM     Your access code will  in 90 days. If you need help or a new code, please contact your [facility] or call [phone] for assistance.        Care EveryWhere ID     This is your Care EveryWhere ID. This could be used by other organizations to access your [facility] medical records  [id number]         Blood Pressure from Last 3 Encounters:   12 136/80    Weight from  Last 3 Encounters:   03/02/12 64.5 kg (142 lb 3.2 oz)              Today, you had the following     No orders found for display         Today's Medication Changes          These changes are accurate as of: 5/17/17  2:21 PM.  If you have any questions, ask your nurse or doctor.               Stop taking these medicines if you haven't already. Please contact your care team if you have questions.     moxifloxacin 0.5 % ophthalmic solution   Commonly known as:  VIGAMOX   Stopped by:  Amy Gee MD                Where to get your medicines      These medications were sent to [id number] - [place], [place] - [address] AT [facility] [address]  [address], [facility] [address]     Phone:  [phone]     erythromycin ophthalmic ointment                Primary Care Provider Office Phone # Fax #    Jerod Milligan [phone]       [address] [address]        Thank you!     Thank you for choosing [facility]  for your care. Our goal is always to provide you with excellent care. Hearing back from our patients is one way we can continue to improve our services. Please take a few minutes to complete the written survey that you may receive in the mail after your visit with us. Thank you!             Your Updated Medication List - Protect others around you: Learn how to safely use, store and throw away your medicines at www.disposemymeds.org.          This list is accurate as of: 5/17/17  2:21 PM.  Always use your most recent med list.                   Brand Name Dispense Instructions for use    atenolol 50 MG tablet    TENORMIN     Take 25 mg by mouth daily       dorzolamide-timolol 2-0.5 % ophthalmic solution    COSOPT    1 Bottle    Place 1 drop into the right eye 2 times daily       erythromycin ophthalmic ointment    ROMYCIN    3.5 g    Apply 1/4 inch strip to right eye daily at bedtime.       fluorometholone  0.1 % ophthalmic susp    FML LIQUIFILM    1 Bottle    Place 1 drop into the right eye 2 times daily       hydrochlorothiazide 25 MG tablet    HYDRODIURIL    90 tablet    Take 1 tablet by mouth daily.       REFRESH OP      Apply to eye 4 times daily Both eyes.

## 2017-05-17 NOTE — LETTER
May 17, 2017      Wilder Martins MD  [facility]  [address] , [address]  [place] [address]  Fax: [phone]      RE: JOSEFA MCGUIRE  : 1935      Dear Dr. Martins:    I had the pleasure of seeing Josefa Mcguire on May 17, 2017 at the [facility].  My exam findings are as follows:      Visual Acuity (Snellen - Linear)      Right Left   Dist cc 20/40 20/50   Dist ph cc 20/30 20/40       Correction:  Glasses         Tonometry (Applanation, 1:19 PM)      Right Left   Pressure 17 16            Main Ophthalmology Exam     Slit Lamp Exam      Right Left    Lids/Lashes prominent elevation secondary to underlying bleb Normal    Conjunctiva/Sclera Elevated, encapsulated bleb, moderate injection Tube covered and in good position    Cornea Clear Dsaek with 3 temporal sutures     Anterior Chamber Deep and quiet Deep and quiet    Iris Round and reactive Pseudoexfoliation material    Lens Posterior chamber intraocular lens Posterior chamber intraocular lens    Vitreous PVD PVD      Fundus Exam      Right Left    Disc thinning inferotemporally  thinning temporally    C/D Ratio 0.4 0.7    Macula Normal Normal    Vessels Normal Normal           History and Present Illness:  Postoperative week #5 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  Blebitis right eye 2017   Follows primarily with Dr. Martins at [facility]    Interval History: Doing much better. Eye is more comfortable. Having trouble seeing with current glasses. It is as if the images between both eyes are different sizes.     Medications:   FML 2 times daily right eye, 1 time daily left eye   Dorzolamide-Timolol/Cosopt 2 times daily right eye    Erythromycin ointment at bedtime right eye  Refresh both eyes     Impression:  1. Blebitis history right eye   No leak today   Bleb encapsulated but slightly more diffuse than 2 weeks ago and intraocular  pressure better since               starting Cosopt and stopping prednisolone     2. Glaucoma both eyes. Primary open angle glaucoma (POAG) -adv   Trabeculectomy with mitomycin-C right eye 2003,    Ahmed left eye 2007   Bleb revision 4/1/2017    3. 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 Martins  2-3 weeks    Thank you for allowing me to participate in her care.       Sincerely,       Amy Gee MD  Glaucoma   [employer] Professor of Ophthalmology

## 2017-07-06 DIAGNOSIS — H40.1133 PRIMARY OPEN ANGLE GLAUCOMA OF BOTH EYES, SEVERE STAGE: ICD-10-CM

## 2017-07-06 RX ORDER — DORZOLAMIDE HYDROCHLORIDE AND TIMOLOL MALEATE 20; 5 MG/ML; MG/ML
1 SOLUTION/ DROPS OPHTHALMIC 2 TIMES DAILY
Qty: 1 BOTTLE | Refills: 11 | Status: SHIPPED | OUTPATIENT
Start: 2017-07-06

## 2017-07-07 DIAGNOSIS — H59.40: ICD-10-CM

## 2017-07-07 RX ORDER — FLUOROMETHOLONE 0.1 %
SUSPENSION, DROPS(FINAL DOSAGE FORM)(ML) OPHTHALMIC (EYE)
Qty: 1 BOTTLE | Refills: 3 | Status: SHIPPED | OUTPATIENT
Start: 2017-07-07 | End: 2017-10-24

## 2017-08-24 DIAGNOSIS — H40.1133 PRIMARY OPEN ANGLE GLAUCOMA OF BOTH EYES, SEVERE STAGE: ICD-10-CM

## 2017-08-24 RX ORDER — TIMOLOL MALEATE 5 MG/ML
1 SOLUTION/ DROPS OPHTHALMIC EVERY MORNING
Qty: 1 BOTTLE | Refills: 11 | Status: SHIPPED | OUTPATIENT
Start: 2017-08-24

## 2017-08-24 RX ORDER — DORZOLAMIDE HCL 20 MG/ML
1 SOLUTION/ DROPS OPHTHALMIC 2 TIMES DAILY
Qty: 1 BOTTLE | Refills: 11 | Status: SHIPPED | OUTPATIENT
Start: 2017-08-24

## 2017-10-24 DIAGNOSIS — H59.40: ICD-10-CM

## 2017-10-27 RX ORDER — FLUOROMETHOLONE 0.1 %
SUSPENSION, DROPS(FINAL DOSAGE FORM)(ML) OPHTHALMIC (EYE)
Qty: 10 ML | Refills: 2 | Status: SHIPPED | OUTPATIENT
Start: 2017-10-27

## 2018-06-05 DIAGNOSIS — H59.40: ICD-10-CM

## 2018-06-06 RX ORDER — FLUOROMETHOLONE 0.1 %
SUSPENSION, DROPS(FINAL DOSAGE FORM)(ML) OPHTHALMIC (EYE)
Qty: 10 ML | Refills: 2 | OUTPATIENT
Start: 2018-06-06

## 2019-01-09 ENCOUNTER — TRANSFERRED RECORDS (OUTPATIENT)
Dept: HEALTH INFORMATION MANAGEMENT | Facility: CLINIC | Age: 84
End: 2019-01-09

## 2019-02-13 ENCOUNTER — TRANSFERRED RECORDS (OUTPATIENT)
Dept: HEALTH INFORMATION MANAGEMENT | Facility: CLINIC | Age: 84
End: 2019-02-13

## 2019-03-13 ENCOUNTER — TRANSFERRED RECORDS (OUTPATIENT)
Dept: HEALTH INFORMATION MANAGEMENT | Facility: CLINIC | Age: 84
End: 2019-03-13

## 2019-04-10 ENCOUNTER — TRANSFERRED RECORDS (OUTPATIENT)
Dept: HEALTH INFORMATION MANAGEMENT | Facility: CLINIC | Age: 84
End: 2019-04-10

## 2019-11-14 ENCOUNTER — RECORDS - HEALTHEAST (OUTPATIENT)
Dept: LAB | Facility: CLINIC | Age: 84
End: 2019-11-14

## 2019-11-14 LAB
ANION GAP SERPL CALCULATED.3IONS-SCNC: 9 MMOL/L (ref 5–18)
BUN SERPL-MCNC: 27 MG/DL (ref 8–28)
CALCIUM SERPL-MCNC: 9.6 MG/DL (ref 8.5–10.5)
CHLORIDE BLD-SCNC: 107 MMOL/L (ref 98–107)
CO2 SERPL-SCNC: 26 MMOL/L (ref 22–31)
CREAT SERPL-MCNC: 1.28 MG/DL (ref 0.6–1.1)
ERYTHROCYTE [DISTWIDTH] IN BLOOD BY AUTOMATED COUNT: 14.1 % (ref 11–14.5)
GFR SERPL CREATININE-BSD FRML MDRD: 40 ML/MIN/1.73M2
GLUCOSE BLD-MCNC: 100 MG/DL (ref 70–125)
HCT VFR BLD AUTO: 40.3 % (ref 35–47)
HGB BLD-MCNC: 12.8 G/DL (ref 12–16)
MCH RBC QN AUTO: 30.3 PG (ref 27–34)
MCHC RBC AUTO-ENTMCNC: 31.8 G/DL (ref 32–36)
MCV RBC AUTO: 96 FL (ref 80–100)
PLATELET # BLD AUTO: 158 THOU/UL (ref 140–440)
PMV BLD AUTO: 12.2 FL (ref 8.5–12.5)
POTASSIUM BLD-SCNC: 4.2 MMOL/L (ref 3.5–5)
RBC # BLD AUTO: 4.22 MILL/UL (ref 3.8–5.4)
SODIUM SERPL-SCNC: 142 MMOL/L (ref 136–145)
TSH SERPL DL<=0.005 MIU/L-ACNC: 2.38 UIU/ML (ref 0.3–5)
WBC: 6.2 THOU/UL (ref 4–11)

## 2019-11-15 LAB — 25(OH)D3 SERPL-MCNC: 76.3 NG/ML (ref 30–80)

## 2020-07-13 ENCOUNTER — RECORDS - HEALTHEAST (OUTPATIENT)
Dept: LAB | Facility: CLINIC | Age: 85
End: 2020-07-13

## 2020-07-13 LAB
ALBUMIN SERPL-MCNC: 3.5 G/DL (ref 3.5–5)
ALP SERPL-CCNC: 118 U/L (ref 45–120)
ALT SERPL W P-5'-P-CCNC: 20 U/L (ref 0–45)
ANION GAP SERPL CALCULATED.3IONS-SCNC: 8 MMOL/L (ref 5–18)
AST SERPL W P-5'-P-CCNC: 19 U/L (ref 0–40)
BASOPHILS # BLD AUTO: 0.1 THOU/UL (ref 0–0.2)
BASOPHILS NFR BLD AUTO: 1 % (ref 0–2)
BILIRUB SERPL-MCNC: 0.4 MG/DL (ref 0–1)
BUN SERPL-MCNC: 35 MG/DL (ref 8–28)
CALCIUM SERPL-MCNC: 9.7 MG/DL (ref 8.5–10.5)
CHLORIDE BLD-SCNC: 110 MMOL/L (ref 98–107)
CO2 SERPL-SCNC: 26 MMOL/L (ref 22–31)
CREAT SERPL-MCNC: 1.17 MG/DL (ref 0.6–1.1)
EOSINOPHIL # BLD AUTO: 0.2 THOU/UL (ref 0–0.4)
EOSINOPHIL NFR BLD AUTO: 3 % (ref 0–6)
ERYTHROCYTE [DISTWIDTH] IN BLOOD BY AUTOMATED COUNT: 13.6 % (ref 11–14.5)
GFR SERPL CREATININE-BSD FRML MDRD: 44 ML/MIN/1.73M2
GLUCOSE BLD-MCNC: 66 MG/DL (ref 70–125)
HCT VFR BLD AUTO: 44 % (ref 35–47)
HGB BLD-MCNC: 13.8 G/DL (ref 12–16)
LYMPHOCYTES # BLD AUTO: 2.4 THOU/UL (ref 0.8–4.4)
LYMPHOCYTES NFR BLD AUTO: 35 % (ref 20–40)
MCH RBC QN AUTO: 31.5 PG (ref 27–34)
MCHC RBC AUTO-ENTMCNC: 31.4 G/DL (ref 32–36)
MCV RBC AUTO: 101 FL (ref 80–100)
MONOCYTES # BLD AUTO: 0.6 THOU/UL (ref 0–0.9)
MONOCYTES NFR BLD AUTO: 8 % (ref 2–10)
NEUTROPHILS # BLD AUTO: 3.6 THOU/UL (ref 2–7.7)
NEUTROPHILS NFR BLD AUTO: 53 % (ref 50–70)
PLATELET # BLD AUTO: 165 THOU/UL (ref 140–440)
PMV BLD AUTO: 12.1 FL (ref 8.5–12.5)
POTASSIUM BLD-SCNC: 3.8 MMOL/L (ref 3.5–5)
PROT SERPL-MCNC: 7.1 G/DL (ref 6–8)
RBC # BLD AUTO: 4.38 MILL/UL (ref 3.8–5.4)
SODIUM SERPL-SCNC: 144 MMOL/L (ref 136–145)
WBC: 6.9 THOU/UL (ref 4–11)